# Patient Record
Sex: FEMALE | Race: BLACK OR AFRICAN AMERICAN
[De-identification: names, ages, dates, MRNs, and addresses within clinical notes are randomized per-mention and may not be internally consistent; named-entity substitution may affect disease eponyms.]

---

## 2018-02-09 ENCOUNTER — HOSPITAL ENCOUNTER (EMERGENCY)
Dept: HOSPITAL 24 - ER | Age: 23
LOS: 1 days | Discharge: HOME | End: 2018-02-10
Payer: MEDICAID

## 2018-02-09 VITALS — DIASTOLIC BLOOD PRESSURE: 79 MMHG | SYSTOLIC BLOOD PRESSURE: 121 MMHG

## 2018-02-09 VITALS — BODY MASS INDEX: 47.9 KG/M2

## 2018-02-09 DIAGNOSIS — J01.80: ICD-10-CM

## 2018-02-09 DIAGNOSIS — Z72.0: ICD-10-CM

## 2018-02-09 DIAGNOSIS — J40: Primary | ICD-10-CM

## 2018-02-09 PROCEDURE — 87651 STREP A DNA AMP PROBE: CPT

## 2018-02-09 PROCEDURE — 87502 INFLUENZA DNA AMP PROBE: CPT

## 2018-02-09 PROCEDURE — 71045 X-RAY EXAM CHEST 1 VIEW: CPT

## 2018-02-09 PROCEDURE — 96372 THER/PROPH/DIAG INJ SC/IM: CPT

## 2018-02-09 PROCEDURE — 99282 EMERGENCY DEPT VISIT SF MDM: CPT

## 2018-02-09 NOTE — DR.GENAD
HPI





- PCP


Primary Care Physician: NLD





- Complaint/Symptoms


Chief Complaint Doctors Comments: Patient states she was in the emergency room 

at [facility] and they told her she had the flu and pneumonia and gave 

her a prescription for amoxicillin and she left the emergency and came to the 

emergency here.  States she has been having right side and back pain with 

aching all over.  states she smokes one pack cigarettes daily and drinks 

occasionally.  She denies any recent trauma.   States she has a non productive 

cough but denies SOB or wheezing.


Chief Complaint:: Patient stated that she went to the ER in [place] earlier 

and was told that she had Flu and Pneumonia. Patient stated she needs a 

breathing tx. d/t her hx. of Asthma and she feels tight. Patient stated that 

she can't find her inhaler.


Self Treatment fo Chief Complaint: Patient stated that she also has sore are 

her Clitoris that she would like to be looked at.





- Nurses notes reviewed


Nurses Notes Review: Yes





- Source


History Provided: Patient





- Mode of Arrival


Mode of Arrival: Ambulatory





- Timing


Onset of Chief Complaint: 18


Came on: Gradually





- Duration


Duration: Constant


How lon


Duration: Days





- Location


Location: right sided chest pain; aching all over





- Severity


Severity: Moderate





- Modifying Factors


Worsens:: nothing


Improves:: nothing





PMH





- PMH


Past Medical History: Yes


Past Medical History: Asthma, Hypertension


Past Medical History Comment: PCOS


Past Surgical History: Yes


Surgical History: Tonsillectomy


Past Surgical History Comment: Dental Surgery





- Family History


History of Family Medical Conditions: Yes


Family Medical History: Diabetes Mellitus, Hypertension


Family Medical History Comment: Dementia





- Social History


Does patient currently use any type of tobacco product: Yes


Have you used tobacco products in the last 12 months: Yes


Type of Tobacco Use: Cigarettes


Alcohol Use: Occasionally


Do you use any recreational Drugs:: Yes ([facility])


Lives With: Alone


Lives Where: Homeless





- infectious screening


In the last 2 months have you had wt loss of >10#?: NO


Have you had fever, night sweats or hemotysis?: No


Have you traveled outside the country in the last 6 months?: No


Isolation: Standard





ROS





- Review of Systems


Constitutional: No Symptoms Reported.  negative: See HPI, Chills, Diaphoresis, 

Fever, Malaise, Weakness, Irritable, Fatigue, Loss of Appetite, Other


Eyes: No Symptoms Reported


ENTM: No Symptoms Reported, Nose Discharge, Nose Congestion, Throat Pain.  

negative: See HPI, Ear Pain, Ear Discharge, Pulling on Ears, Hearing Loss, Nose 

Pain, Epistaxis, Mouth Pain, Mouth Swelling, Loose Teeth, Drooling, Throat 

Swelling, Ear Foreign Body


Respiratoy: No Symptoms Reported, Non-Productive Cough.  negative: See HPI, 

Productive Cough, Moist Cough, Dry Cough, Hacking Cough, Barking Cough, Brassy 

Cough, Orthopnea, Short of Breath, Stridor, Wheezing, Hemoptysis, Other


Cardiovascular: No Symptoms Reported.  negative: See HPI, Chest Pain, Edema, 

Palpitations, Syncope, Cyanosis, Skin Mottling, Other


Gastrointestinal/Abdominal: No Symptoms Reported.  negative: See HPI, Abdominal 

Pain, Constipation, Diarrhea, Nausea, Vomiting, Food Intolerance, Other


Genitourinary: No Symptoms Reported.  negative: See HPI, Discharge, Dysuria, 

Frequency, Hematuria, Pain, Bleeding, Other


Neurological: No Symptoms Reported


Musculoskeletal: No Symptoms Reported.  negative: See HPI, Back Pain, Gout, 

Joint Pain, Joint Swelling, Muscle Pain, Muscle Stiffness, Neck Pain, Right, 

Left, Neck, Chest wall, Rib(s), Back, Shoulder, Arm, Elbow, Forearm, Wrist, Hand

, Pelvis, Hip, Leg, Knee, Ankle, Foot, Other


Integumentary: No Symptoms Reported.  negative: See HPI, Change in Color, 

Change in Hair/Nails, Dryness, Lesions, Lumps, Rash, Itching, Wound, Bruises, 

Juandice, Other


Hematologic/Lymphatic: No Symptoms Reported


Endocrine: No Symptoms Reported


Psychiatric: No Symptoms Reported.  negative: See HPI, Anxiety, Depression, 

Hallucinations, Excessive crying, Suicidal, Other





PE





- Vital Signs


Vitals: 


 





Temperature                      99.2 F


Pulse Rate                       95


Respiratory Rate                 24


Blood Pressure                   121/79


O2 Sat by Pulse Oximetry         98











- General


Limitations: No Limitations, Language Barrier


General Appearance: Alert, In Distress (mod), Obese





- Head


Head Exam: Normal Inspection, Atraumatic, Normocephalic





- Eyes


Eye exam: Normal Appearance, PERRL, EOMI.  negative: Scleral Icterus, 

Conjunctival Injection, Nystagmus, Miosis, Mydrasis, Periorbital Swelling, 

Periorbital Tenderness, Other





- ENT


ENT Exam: Normal Exam, Normal Oropharynx, Normal  External Ear Exam, Mucous 

Membranes Moist (nasal congestion; purulent discharge), TM's Normal Bilaterally


External Ear Exam: Normal External Inspection


TM/Canal Exam: Bilateral Normal


Nose Exam: Normal Nose Exam


Mouth Exam: Normal Inspection


Throat Exam: Normal Inspection





- Neck


Neck Exam: Normal Inspection, Full ROM, Trachea Midline





- Chest


Chest Inspection: Normal Inspection, Symmetric Chest Wall Rise





- Respiratory


Respiratory Exam: Normal Lung Sounds Bilat


Respiratory Exam: Bilateral Clear to Auscultation





- Cardiovascular


Cardiovascular Exam: Regular Rate, Normal Rhythm, Normal Heart Sounds





- Abdominal Exam


Abdominal Exam: Normal Inspection, Normal Bowel Sounds, Soft


Abdominal Tenderness: negative: RUQ, RLQ, LUQ, LLQ, Epigastrium, Suprapubic, 

Diffuse, Mild, Moderate, Severe, Other





- Extremities


Extremities Exam: Normal Inspection, Full ROM, Normal Capillary Refill.  

negative: Tenderness, Edema, Joint Swelling, Calf Tenderness, Other





- Back


Back Exam: Normal Inspection, Full ROM.  negative: Tenderness, (R) CVA 

Tenderness, (L) CVA Tenderness, Muscle Spasm, Paraspinal Tenderness, Vertebral 

Tenderness, Rashes, (R) Sciatic Notch Tenderness, (L) Sciatic Notch Tendern, (R

) Straight Leg Raise, (L) Straight Leg Raise, Other





- Neurologic


Neurological Exam: Alert, Oriented X3, CN II-XII Intact, Normal Gait, Reflexes 

Normal





- Psychiatric


Psychiatric Exam: Normal Affect, Normal Mood





- Skin


Skin Exam: Warm, Dry, Intact, Normal Color.  negative: Rash, Cyanosis, 

Diaphoresis, Erythema, Pallor, Mottled, Other





ROR





- Labs Reviewed


Laboratory Results Reviewed?: Yes (all labs and x-ray results reviewed and 

discussed with patient)


Laboratory: 


 











Influenza Type A (PCR)  Negative  (NEGATIVE)   18  23:25    


 


Influenza Type B (PCR)  Negative  (NEGATIVE)   18  23:25    


 


S. pyogenes (TEM-PCR)  Not detected  (NOT DETECT)   18  23:25    














- XRAY


XRAY Interpreted by: Radiologist (CXR:  No acute cardiopulmonary abnormality)





- Diagnosis


Discharge Problem: 


 Bronchitis





Sinusitis, acute maxillary


Qualifiers:


 Recurrence: non-recurrent Qualified Code(s): J01.00 - Acute maxillary sinusitis

, unspecified








- Discharge Plan


Disposition:  HOME, SELF-CARE


Condition: Stable


Prescriptions: 


Cephalexin [KEFLEX  MG *] 500 mg PO TID #30 cap


Cetirizine HCl [Zyrtec Tab 10 mg] 10 mg PO DAILY #30 tab


Fluticasone Nasal [place] [FLONASE NASAL SPRAY *] 2 sprays ENOSTRIL DAILY #1 each





- Follow ups/Referrals


Follow ups/Referrals: 


[facility],None [Primary Care Provider] - 3 days


FABRIZIO MCHUGH [STAFF PHYSICIAN] - 3 days





- Instructions


Instructions:  Sinusitis, Adult, Easy-to-Read, Acute Bronchitis

## 2018-02-10 NOTE — RAD
AP Chest



Indication: Chest pain with cough



Comparison: None available



Findings:



The trachea is midline.  The cardiac silhouette is unremarkable.  The lungs are clear without focal i
nfiltrate or effusion.  The bony thorax is unremarkable.  



IMPRESSION: 

 

1.  No acute cardiopulmonary abnormality.







Reported By:Electronically Signed by TESFAYE MEYER MD at 2/10/2018 12:20:32 AM